# Patient Record
Sex: FEMALE | Race: BLACK OR AFRICAN AMERICAN | NOT HISPANIC OR LATINO | Employment: STUDENT | URBAN - METROPOLITAN AREA
[De-identification: names, ages, dates, MRNs, and addresses within clinical notes are randomized per-mention and may not be internally consistent; named-entity substitution may affect disease eponyms.]

---

## 2022-06-02 ENCOUNTER — TELEPHONE (OUTPATIENT)
Dept: OBSTETRICS AND GYNECOLOGY | Facility: CLINIC | Age: 22
End: 2022-06-02
Payer: MEDICAID

## 2022-06-02 NOTE — TELEPHONE ENCOUNTER
----- Message from Huang Olmstead sent at 6/2/2022 12:25 PM CDT -----  Type:  Patient Returning Call    Who Called:Pt   Would the patient rather a call back or a response via [facility]? call  Best Call Back Number:[phone]  Additional Information:     New pt  Pt would like to est care  Pt thinks she is pregnant  Pt states she's having cramps / morning sickness  Pt stated she did not take a pregnancy test

## 2023-12-05 ENCOUNTER — HOSPITAL ENCOUNTER (EMERGENCY)
Facility: HOSPITAL | Age: 23
Discharge: HOME | End: 2023-12-05
Attending: EMERGENCY MEDICINE
Payer: MEDICAID

## 2023-12-05 VITALS
RESPIRATION RATE: 18 BRPM | DIASTOLIC BLOOD PRESSURE: 100 MMHG | SYSTOLIC BLOOD PRESSURE: 153 MMHG | OXYGEN SATURATION: 98 % | WEIGHT: 147 LBS | BODY MASS INDEX: 26.05 KG/M2 | HEART RATE: 73 BPM | TEMPERATURE: 98 F | HEIGHT: 63 IN

## 2023-12-05 DIAGNOSIS — S01.01XA LACERATION OF SCALP, INITIAL ENCOUNTER: Primary | ICD-10-CM

## 2023-12-05 PROCEDURE — 63600175 PHARM REV CODE 636 W HCPCS: Performed by: EMERGENCY MEDICINE

## 2023-12-05 PROCEDURE — 99283 EMERGENCY DEPT VISIT LOW MDM: CPT | Mod: 25

## 2023-12-05 PROCEDURE — 12001 RPR S/N/AX/GEN/TRNK 2.5CM/<: CPT

## 2023-12-05 PROCEDURE — 90715 TDAP VACCINE 7 YRS/> IM: CPT | Performed by: EMERGENCY MEDICINE

## 2023-12-05 PROCEDURE — 25000003 PHARM REV CODE 250: Performed by: EMERGENCY MEDICINE

## 2023-12-05 PROCEDURE — 90471 IMMUNIZATION ADMIN: CPT | Performed by: EMERGENCY MEDICINE

## 2023-12-05 RX ORDER — CEPHALEXIN 500 MG/1
500 CAPSULE ORAL EVERY 12 HOURS
Qty: 14 CAPSULE | Refills: 0 | Status: SHIPPED | OUTPATIENT
Start: 2023-12-05 | End: 2023-12-12

## 2023-12-05 RX ORDER — LIDOCAINE HYDROCHLORIDE 10 MG/ML
10 INJECTION, SOLUTION EPIDURAL; INFILTRATION; INTRACAUDAL; PERINEURAL
Status: COMPLETED | OUTPATIENT
Start: 2023-12-05 | End: 2023-12-05

## 2023-12-05 RX ADMIN — LIDOCAINE HYDROCHLORIDE 50 MG: 10 SOLUTION INTRAVENOUS at 09:12

## 2023-12-05 RX ADMIN — CLOSTRIDIUM TETANI TOXOID ANTIGEN (FORMALDEHYDE INACTIVATED), CORYNEBACTERIUM DIPHTHERIAE TOXOID ANTIGEN (FORMALDEHYDE INACTIVATED), BORDETELLA PERTUSSIS TOXOID ANTIGEN (GLUTARALDEHYDE INACTIVATED), BORDETELLA PERTUSSIS FILAMENTOUS HEMAGGLUTININ ANTIGEN (FORMALDEHYDE INACTIVATED), BORDETELLA PERTUSSIS PERTACTIN ANTIGEN, AND BORDETELLA PERTUSSIS FIMBRIAE 2/3 ANTIGEN 0.5 ML: 5; 2; 2.5; 5; 3; 5 INJECTION, SUSPENSION INTRAMUSCULAR at 10:12

## 2023-12-05 NOTE — DISCHARGE INSTRUCTIONS
Please follow-up with your primary care provider or [facility] in the next 2 days for wound check, 10 days for staple removal   Keflex as directed until all gone  Return for any concerns

## 2023-12-05 NOTE — ED PROVIDER NOTES
Encounter Date: 12/5/2023       History     Chief Complaint   Patient presents with    Laceration     Top of head, trying to cut hair out with scissors last night     22-year-old female presents emergency department with complaint of laceration to the right occipital scalp.  Patient states that she was attempting to remove her so in hair extensions with a scissors and accidentally lacerated her scalp.  Patient is unsure of her last tetanus has no further complaints      Review of patient's allergies indicates:  No Known Allergies  No past medical history on file.  No past surgical history on file.  No family history on file.     Review of Systems   Constitutional: Negative.    Eyes: Negative.    Respiratory: Negative.     Genitourinary: Negative.    Skin:  Positive for wound.   All other systems reviewed and are negative.      Physical Exam     Initial Vitals [12/05/23 0732]   BP Pulse Resp Temp SpO2   (!) 153/100 73 18 98.2 °F (36.8 °C) 98 %      MAP       --         Physical Exam    Nursing note and vitals reviewed.  Constitutional: She appears well-developed and well-nourished.   HENT:   Right Ear: External ear normal.   Left Ear: External ear normal.   Eyes: EOM are normal. Pupils are equal, round, and reactive to light.   Neck: Neck supple.   Normal range of motion.  Cardiovascular:  Normal rate, regular rhythm and normal heart sounds.           Pulmonary/Chest: Breath sounds normal.   Abdominal: Abdomen is soft.   Musculoskeletal:      Cervical back: Normal range of motion and neck supple.     Neurological: She is alert and oriented to person, place, and time.   Skin:   Laceration to the right occipital skull proximally 2 cm         ED Course   Lac Repair    Date/Time: 12/5/2023 9:52 AM    Performed by: Julita Clark FNP  Authorized by: Dave Phillips Jr., MD    Consent:     Consent obtained:  Verbal    Consent given by:  Patient    Risks discussed:  Infection, need for additional repair, pain, poor  cosmetic result, poor wound healing and retained foreign body  Universal protocol:     Patient identity confirmed:  Verbally with patient  Anesthesia:     Anesthesia method:  Local infiltration    Local anesthetic:  Lidocaine 1% w/o epi  Laceration details:     Location:  Scalp    Scalp location:  Occipital    Length (cm):  2  Exploration:     Imaging outcome: foreign body not noted      Wound extent: no foreign bodies/material noted      Contaminated: no    Treatment:     Area cleansed with:  Saline    Amount of cleaning:  Standard    Irrigation solution:  Sterile saline    Irrigation method:  Syringe    Visualized foreign bodies/material removed: no    Skin repair:     Repair method:  Staples    Number of staples:  2  Approximation:     Approximation:  Close  Post-procedure details:     Procedure completion:  Tolerated well, no immediate complications    Labs Reviewed - No data to display       Imaging Results    None          Medications   LIDOcaine (PF) 10 mg/ml (1%) injection 100 mg (has no administration in time range)   Tdap vaccine injection 0.5 mL (has no administration in time range)     Medical Decision Making  22-year-old female presents emergency department with complaint of laceration to the right occipital scalp.  Patient states that she was attempting to remove her so in hair extensions with a scissors and accidentally lacerated her scalp.  Patient is unsure of her last tetanus has no further complaints    Considerations include but not limited to, scalp laceration, abrasion    22-year-old female presents emergency department with a laceration to the right occipital scalp after accidentally lacerating scalp with scissors.  Wound repaired with 2 staples patient will be placed on prophylactic Keflex secondary to this occurred last night.  Tetanus was updated    Amount and/or Complexity of Data Reviewed  Independent Historian: parent    Risk  Prescription drug management.                                       Clinical Impression:  Final diagnoses:  [S01.01XA] Laceration of scalp, initial encounter (Primary)          ED Disposition Condition    Discharge Stable          ED Prescriptions       Medication Sig Dispense Start Date End Date Auth. Provider    cephALEXin (KEFLEX) 500 MG capsule Take 1 capsule (500 mg total) by mouth every 12 (twelve) hours. for 7 days 14 capsule 12/5/2023 12/12/2023 Julita Clark FNP          Follow-up Information       Follow up With Specialties Details Why Contact [facility]   2 days for wound check [address] [place] [id number]  [phone]               Julita Clark FNP  12/05/23 0956

## 2023-12-28 ENCOUNTER — HOSPITAL ENCOUNTER (EMERGENCY)
Facility: HOSPITAL | Age: 23
Discharge: HOME | End: 2023-12-28
Attending: EMERGENCY MEDICINE
Payer: MEDICAID

## 2023-12-28 VITALS
OXYGEN SATURATION: 98 % | SYSTOLIC BLOOD PRESSURE: 136 MMHG | BODY MASS INDEX: 26.22 KG/M2 | WEIGHT: 148 LBS | TEMPERATURE: 99 F | HEART RATE: 78 BPM | DIASTOLIC BLOOD PRESSURE: 87 MMHG | RESPIRATION RATE: 18 BRPM | HEIGHT: 63 IN

## 2023-12-28 DIAGNOSIS — Z48.02 ENCOUNTER FOR STAPLE REMOVAL: Primary | ICD-10-CM

## 2023-12-28 PROCEDURE — 99281 EMR DPT VST MAYX REQ PHY/QHP: CPT
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
## 2023-12-28 NOTE — DISCHARGE INSTRUCTIONS
You were seen and evaluated in the ER today.  We have removed your staples with no complications.  You can wash her hair is normal.  Please follow-up with your PCP as needed.  Please return to the ED for any worsening symptoms such as chest pain, shortness of breath, fever not controlled with Tylenol or ibuprofen or uncontrolled pain.      Our goal in the emergency department is to always give you outstanding care and exceptional service. You may receive a survey by mail or e-mail in the next week regarding your experience in our ED. We would greatly appreciate your completing and returning the survey. Your feedback provides us with a way to recognize our staff who give very good care and it helps us learn how to improve when your experience was below our aspiration of excellence.